# Patient Record
Sex: MALE | Race: WHITE
[De-identification: names, ages, dates, MRNs, and addresses within clinical notes are randomized per-mention and may not be internally consistent; named-entity substitution may affect disease eponyms.]

---

## 2019-06-06 ENCOUNTER — HOSPITAL ENCOUNTER (EMERGENCY)
Dept: HOSPITAL 10 - FTE | Age: 59
Discharge: HOME | End: 2019-06-06
Payer: COMMERCIAL

## 2019-06-06 ENCOUNTER — HOSPITAL ENCOUNTER (EMERGENCY)
Dept: HOSPITAL 91 - FTE | Age: 59
Discharge: HOME | End: 2019-06-06
Payer: COMMERCIAL

## 2019-06-06 VITALS — RESPIRATION RATE: 18 BRPM | HEART RATE: 78 BPM | SYSTOLIC BLOOD PRESSURE: 142 MMHG | DIASTOLIC BLOOD PRESSURE: 74 MMHG

## 2019-06-06 VITALS
HEIGHT: 68 IN | WEIGHT: 207.23 LBS | WEIGHT: 207.23 LBS | BODY MASS INDEX: 31.41 KG/M2 | HEIGHT: 68 IN | BODY MASS INDEX: 31.41 KG/M2

## 2019-06-06 DIAGNOSIS — I10: ICD-10-CM
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
## 2019-06-06 DIAGNOSIS — Z79.82: ICD-10-CM

## 2019-06-06 DIAGNOSIS — S32.009A: Primary | ICD-10-CM
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
## 2019-06-06 DIAGNOSIS — E11.9: ICD-10-CM

## 2019-06-06 PROCEDURE — 72131 CT LUMBAR SPINE W/O DYE: CPT

## 2019-06-06 PROCEDURE — 99285 EMERGENCY DEPT VISIT HI MDM: CPT

## 2019-06-06 PROCEDURE — 72128 CT CHEST SPINE W/O DYE: CPT

## 2019-06-06 PROCEDURE — 96372 THER/PROPH/DIAG INJ SC/IM: CPT

## 2019-06-06 RX ADMIN — KETOROLAC TROMETHAMINE 1 MG: 30 INJECTION, SOLUTION INTRAMUSCULAR at 12:44

## 2019-06-06 RX ADMIN — OXYCODONE HYDROCHLORIDE AND ACETAMINOPHEN 1 TAB: 5; 325 TABLET ORAL at 12:45

## 2019-06-06 NOTE — ERD
ER Documentation


Chief Complaint


Chief Complaint





pt bib self with c/o left back/hip pain radiating to leg for few days





HPI


58-year-old male presents complaint of pain in the lumbar and thoracic spine.  


States that he has a history of back pain for which he has received surgery in 


the past but this is a different type of pain that he is experience.  States 


that the pain is now higher up and also located on the left side.  In addition 


he is unable to walk due to the pain whereas before he is previously ambulatory.


 Patient has history of diabetes.  Denies any trauma, saddle numbness, 


incontinence, weight loss, night sweats, fatigue, focal neurological defecits, 


recent bacterial infection, IV drug use, or immunosuppression..





ROS


All systems reviewed and are negative except as per history of present illness.





Medications


Home Meds


Active Scripts


Oxycodone HCl/Acetaminophen (Percocet 5-325 mg Tablet) 1 Each Tablet, 1-2 EACH 


PO Q6, #10 TAB


   Prov:PAULETTE GALLEGOS         19


Ibuprofen* (Motrin*) 600 Mg Tab, 600 MG PO Q6, #30 TAB


   Prov:PAULETTE GALLEGOS         19


Hydrocodone/Acetaminophen (Norco 5-325 Tablet) 1 Each Tablet, 1 TAB PO Q6H PRN 


for PAIN, #7 TAB


   Prov:LUPE HUFF MD         18


Acetaminophen* (Tylenol*) 325 Mg Tablet, 2 TAB PO Q8 PRN for PAIN AND OR 


ELEVATED TEMP, #20 TAB


   Prov:LUPE HUFF MD         18


Cyclobenzaprine Hcl* (Cyclobenzaprine Hcl*) 10 Mg Tablet, 10 MG PO Q8 PRN for 


MUSCLE SPASMS, #20 TAB


   Prov:BILLY LANDON PA-C         3/19/16


Diclofenac Sodium* (Diclofenac Sodium*) 50 Mg Tablet.dr, 50 MG PO BID, #60 TAB


   Prov:BILLY LANDON PA-C         3/19/16


Naproxen* (Naprosyn*) 500 Mg Tablet, 500 MG PO BID PRN for PAIN AND/OR INFLA


MMATION, #30 TAB


   Prov:LUIS ANGEL LOYD NP         6/24/15


Cyclobenzaprine Hcl* (Cyclobenzaprine Hcl*) 10 Mg Tablet, 5 MG PO TID, #30 TAB


   Prov:LUIS ANGEL LOYD LATOYA         6/24/15


Reported Medications


Glucosamine Sulfate 2KCL (GLUCOSAMINE) 1,000 Mg Tablet, 1000 MG PO DAILY


   13


Multivitamin* (Daily Value*) 1 Each Tablet, 1 EACH PO DAILY


   13


Aspirin (Baby Aspirin) 81 Mg Tab.chew, 81 MG PO DAILY


   13


[Bp Med]   No Conflict Check


   13


Lovastatin (Lovastatin) 40 Mg Tablet, 40 MG PO DAILY


   13


Acetaminophen (Tylenol) 500 Mg Tab


   2/25/10


D-Methorphan Hb/Pe/Chlorphenir (Jacquie-Dm Syrup) 473 Ml Syrup


   2/25/10


Pioglitazone Hcl* (Actos*) 30 Mg Tablet


   2/25/10


Glyburide, Micro-Metformin Hcl (Glyburide-Metformin) 1 Tab Tablet


   2/25/10





Allergies


Allergies:  


Coded Allergies:  


     No Known Drug Allergy (Verified  Allergy, Mild, 6/23/15)





PMhx/Soc


History of Surgery:  Yes (BACK SURGERY , CARDIAC  CATHETER )


Hx Neurological Disorder:  No


Hx Respiratory Disorders:  No


Hx Cardiac Disorders:  Yes (CARDIAC HX/CHEST PAIN, HTN)


Hx Psychiatric Problems:  No


Hx Miscellaneous Medical Probl:  Yes (back surgery)


Hx Alcohol Use:  No


Hx Substance Use:  No


Hx Tobacco Use:  No





FmHx


Family History:  No diabetes, No coronary disease, No other





Physical Exam


Vitals





Vital Signs


  Date      Temp  Pulse  Resp  B/P (MAP)   Pulse Ox  O2          O2 Flow    FiO2


Time                                                 Delivery    Rate


    19  98.3     78    18      142/74        98


     11:59                           (96)





Physical Exam


Const:   No acute distress


Head:   Atraumatic 


Eyes:    Normal Conjunctiva


ENT:    Normal External Ears, Nose and Mouth.


Neck:               Full range of motion. No meningismus.


Resp:   Clear to auscultation bilaterally


Cardio:   Regular rate and rhythm, no murmurs


Abd:    Soft, non tender, non distended. Normal bowel sounds


Skin:   No petechiae or rashes


Back:   Tenderness palpation in both the midline and para spinous areas in the 


lumbar and thoracic area.  There is no bony deformity or step-offs noted.  


Patient is unable to lie down due to pain.  Patient unable to flex or extend 


back.  Patient is not ambulatory.


Ext:    No cyanosis, or edema


Neur:   Awake and alert


Psych:    Normal Mood and Affect


Results 24 hrs





Current Medications


 Medications
   Dose
          Sig/Giancarlo
       Start Time
   Status  Last


 (Trade)       Ordered        Route
 PRN     Stop Time              Admin
Dose


                              Reason                                Admin


 Ketorolac
     60 mg          ONCE  STAT
    19        DC            19


Tromethamine
                 IM
            12:32
 19                12:44



 (Toradol)                                   12:40


 Oxycodone/
    1 tab          ONCE  ONCE
    19        DC            19


Acetaminophen                 PO
            13:00
 19                12:45




  (Percocet                                 13:01


(5/
 325))








Procedures/MDM


                            DIAGNOSTIC IMAGING REPORT





Patient: BETZY MATHIS   : 1960   Age: 58  Sex: M                


       


       MR #:    [id number]   Acct #:   [id number]    DOS: 19 1239


Ordering MD: PAULETTE GALLEGOS    Location:  [facility]   Room/Bed:                    


                       


                                        


PROCEDURE:   CT L-Spine. 


 


CLINICAL INDICATION:  Progressive low back pain with inability to walk


 


TECHNIQUE:   A CT of the lumbar spine was performed on a [employer]  64-slice


CT scanner utilizing high-resolution thin section axial images from the thoracic


lumbar junction through the lumbar sacral junction.  Sagittal and coronal and 


multiplanar reformatted images were made.The CTDIvol is 38.14 mGy and the DLP is


1009.05 mGycm.


 


DICOM images are available.


One or more of the following dose reduction techniques were utilized:


1.) Automated exposure control


2.) Adjustment of the mA +/- kV according to patient's size


3.) Use of iterative reconstruction technique.


 


COMPARISON:   None 


 


FINDINGS:


Acute appearing, mildly displaced fractures of the left L1 (axial image 26), L2 


(axial image 38), L3 vertebral bodies (axial image 51). No acute appearing 


vertebral body fractures detected.


 


There is moderate to severe degenerate disc disease at L4-L5 and L5-S1 with 


vacuum phenomena and endplate Schmorl node formation.


 


No acute abnormality of the paravertebral soft tissues detected. Moderate size 


right renal cyst. Mild to moderate calcified arterial atherosclerosis. 


 


T12-L1: The disk is normal in height.  


 


L1-2: The disk is normal in height.  No significant central canal or foraminal 


stenosis.


 


L2-3: Mild disc height loss.  4 mm broad-based disc bulge. Mild facet 


arthropathy. Mild narrowing of the central canal and bilateral neural foramen.


 


L3-4: The disk is normal in height.  4 mm broad-based disc bulge. Moderate 


bilateral foraminal stenosis. Central canal appears patent.


 


L4-5: Moderate to severe disc height loss and vacuum phenomena. Mild disc 


osteophyte complex formation. Mild facet arthropathy. Moderate to severe 


bilateral foraminal stenosis. Central canal  is widely patent.


 


L5-S1: Moderate to severe disc height loss. Disc osteophyte complex formation. 


Mild facet arthropathy. Moderate to severe right and severe left foraminal 


stenosis. Central canal appears adequate patent.


 


IMPRESSION:


Acute appearing, mildly displaced fractures of the left L1, L2, L3 vertebral 


body transverse processes.


 


No evidence of vertebral body fracture or vertebral body height loss.


 


Multilevel spondylosis, most pronounced in the lower lumbar spine where there is


moderate bilateral foraminal stenosis at L3-L4, and moderate to severe bilateral


foraminal stenosis at L4-L5 and L5-S1, as above.


 


Other degenerative changes are detailed by level above.


 


RPTAT:AAJJ


_____________________________________________ 


R-Ryan Franke, Physician           Date    Time 


Electronically viewed and signed by R-Ryan Franke, Physician on 2019 


13:46 


 


D:  2019 13:46  T:  2019 13:46


RF/





CC: PAULETTE GALLEGOS





[id number]


DIAGNOSTIC IMAGING REPORT





Patient: BETZY MATHIS   : 1960   Age: 58  Sex: M                


       


       MR #:    [id number]   Acct #:   [id number]    DOS: 19 1239


Ordering MD: PAULETTE GALLEGOS    Location:  [facility]   Room/Bed:                    


                       


                                        


PROCEDURE:   CT thoracic spine 


 


CLINICAL INDICATION:   Progressive back pain and unable to walk


 


TECHNIQUE:   A CT of the thoracic spine was performed on a [employer] 64-slice CT [facility] utilizing high-resolution axial imaging from the cervical thoracic 


junction through the thoracolumbar junction.  Sagittal, coronal, and multiplanar


reformatted images were made. The CTDIvol is 35.42 mGy and the DLP is 1460.3 


mGycm.


 


DICOM images are available.


One or more of the following dose reduction techniques were utilized:


1.) Automated exposure control


2.) Adjustment of the mA +/- kV according to patient's size


3.) Use of iterative reconstruction technique.


 


COMPARISON:   CT CHEST 2018 


 


FINDINGS:


No evidence of acute fracture of the thoracic spine. Vertebral body heights are 


intact. There is mild straightening of the normal kyphosis and minimal 


levoscoliosis, however no vertebral body subluxation or acute appearing 


malalignment.


 


There is mild multilevel disc disease with areas of endplate osteophyte 


formation, and multilevel small endplate Schmorl node formation. No evidence of 


significant disc herniation or spinal canal stenosis. No high-grade bony 


foraminal stenosis detected. There no acute abnormality of the paravertebral 


soft tissues detected. Minimal bilateral partial atelectasis.


 


IMPRESSION:


No acute fracture of the thoracic spine.


 


Mild multilevel disc disease without evidence of significant spinal stenosis.


 


RPTAT:AAJJ


_____________________________________________ 


R-Ryan Franke, Physician           Date    Time 


Electronically viewed and signed by R-Ryan Franke, Physician on 2019 


13:40 


 


D:  2019 13:40  T:  2019 13:40


RF/





CC: PAULETTE GALLEGOS





[id number]





MDM: Given patient's complaint of progressive back pain with new and worsening 


symptoms, decision was made to do CT of lumbar and thoracic spine to rule out 


emergent pathology.  Results showed fracture of L1, L2, L3.  I discussed the 


case with my supervising physician she stated that patient will be fit for 


discharge with  Ortho follow-up assuming patient pain is controlled.  I 


discussed the plan with the patient he felt that his pain was controlled and he 


would be okay for discharge. I have low suspicion for epidural abscess, cauda 


equina, abdominal aortic aneurysm,  pyelonephritis, aortic dissection, or other 


emergent conditions based on patient history and exam findings.  Patient told if


they experience leg weakness or numbness, or incontinence they need to return to


the ER immediately. Patient discharged with strict ER precautions. Patient 


advised to follow up with PMD. All questions answered at discharge.





Departure


Diagnosis:  


   Primary Impression:  


   Back pain


   Back pain location:  back pain in unspecified location  Chronicity:  chronic 


   Back pain laterality:  unspecified  Qualified Codes:  M54.9 - Dorsalgia, 


   unspecified; G89.29 - Other chronic pain


   Additional Impression:  


   Back fracture


   Encounter type:  initial encounter  Fracture of vertebra location:  lumbar  


   Lumbar vertebra fracture level:  unspecified lumbar vertebra  Fracture type: 


   closed  Fracture morphology:  unspecified fracture morphology  Qualified 


   Codes:  S32.009A - Unspecified fracture of unspecified lumbar vertebra, 


   initial encounter for closed fracture


Condition:  PAULETTE Sifuentes                2019 12:55